# Patient Record
Sex: FEMALE | Race: WHITE | Employment: UNEMPLOYED | URBAN - NONMETROPOLITAN AREA
[De-identification: names, ages, dates, MRNs, and addresses within clinical notes are randomized per-mention and may not be internally consistent; named-entity substitution may affect disease eponyms.]

---

## 2020-02-24 ENCOUNTER — HOSPITAL ENCOUNTER (EMERGENCY)
Age: 30
Discharge: HOME | End: 2020-02-24
Payer: COMMERCIAL

## 2020-02-24 VITALS
RESPIRATION RATE: 16 BRPM | SYSTOLIC BLOOD PRESSURE: 120 MMHG | HEART RATE: 80 BPM | DIASTOLIC BLOOD PRESSURE: 71 MMHG | TEMPERATURE: 97.8 F | OXYGEN SATURATION: 98 %

## 2020-02-24 LAB
GROUP A STREP CULTURE, REFLEX: NEGATIVE
REFLEX THROAT C + S: NORMAL

## 2020-02-24 PROCEDURE — 99202 OFFICE O/P NEW SF 15 MIN: CPT | Performed by: NURSE PRACTITIONER

## 2020-02-24 PROCEDURE — 87070 CULTURE OTHR SPECIMN AEROBIC: CPT

## 2020-02-24 PROCEDURE — 87880 STREP A ASSAY W/OPTIC: CPT

## 2020-02-24 PROCEDURE — 99213 OFFICE O/P EST LOW 20 MIN: CPT

## 2020-02-24 RX ORDER — AMOXICILLIN 500 MG/1
500 CAPSULE ORAL 3 TIMES DAILY
Qty: 30 CAPSULE | Refills: 0 | Status: SHIPPED | OUTPATIENT
Start: 2020-02-24 | End: 2020-03-05

## 2020-02-24 ASSESSMENT — ENCOUNTER SYMPTOMS
DIARRHEA: 0
SHORTNESS OF BREATH: 0
COUGH: 1
RHINORRHEA: 1
VOMITING: 0
SORE THROAT: 1
SINUS PRESSURE: 0
NAUSEA: 0
BACK PAIN: 0
TROUBLE SWALLOWING: 0

## 2020-02-24 ASSESSMENT — PAIN DESCRIPTION - FREQUENCY: FREQUENCY: CONTINUOUS

## 2020-02-24 ASSESSMENT — PAIN SCALES - GENERAL: PAINLEVEL_OUTOF10: 7

## 2020-02-24 ASSESSMENT — PAIN DESCRIPTION - PAIN TYPE: TYPE: ACUTE PAIN

## 2020-02-24 ASSESSMENT — PAIN DESCRIPTION - LOCATION: LOCATION: THROAT

## 2020-02-24 ASSESSMENT — PAIN DESCRIPTION - DESCRIPTORS: DESCRIPTORS: SHARP

## 2020-02-25 NOTE — ED TRIAGE NOTES
Patient walked to room 2 for nasal congestion, sore throat onset a week ago and a cough that started today. No other needs.

## 2020-02-25 NOTE — ED PROVIDER NOTES
[facility] 36  Urgent Care Encounter       CHIEF COMPLAINT       Chief Complaint   Patient presents with    Pharyngitis     on and off for a week    Cough     onset today, yellow production     Nasal Congestion     runny nose        Nurses Notes reviewed and I agree except as noted in the HPI. HISTORY OF PRESENT ILLNESS   Torrie Berrios is a 26 y.o. female who presents to the urgent care center complaining of a sore throat that is been intermittently off for the past week. Patient also has had nasal congestion with postnasal drainage. Patient rates her throat pain 7 on a 10 scale. Patient states she has been drinking fluids. The patient stated today she started a cough with yellow mucus. The patient denies any fever or chills states that she has had some nausea. The patient stated that she is going to be flying out of the country in another week and had some concerns about getting treated prior to leaving. The patient at present time sitting on table skin is warm and dry does not appear to be in any acute distress. The history is provided by the patient. No  was used. Cough   Cough characteristics:  Productive  Sputum characteristics:  Yellow  Severity:  Moderate  Onset quality:  Sudden  Duration:  1 day  Timing:  Intermittent  Progression:  Waxing and waning  Chronicity:  New  Smoker: no    Associated symptoms: rhinorrhea and sore throat    Associated symptoms: no chest pain, no chills, no ear pain, no fever, no headaches, no rash and no shortness of breath    Rhinorrhea:     Quality:  Clear    Severity:  Mild    Duration:  1 week    Timing:  Intermittent    Progression:  Waxing and waning  Sore throat:     Severity:  Mild    Onset quality:  Gradual    Duration:  1 week    Timing:  Constant    Progression:  Unchanged      REVIEW OF SYSTEMS     Review of Systems   Constitutional: Negative for activity change, appetite change, chills, fatigue and fever. HENT: Positive for congestion, rhinorrhea and sore throat. Negative for ear pain, sinus pressure and trouble swallowing. Respiratory: Positive for cough. Negative for shortness of breath. Cardiovascular: Negative for chest pain. Gastrointestinal: Negative for diarrhea, nausea and vomiting. Genitourinary: Negative for decreased urine volume and difficulty urinating. Musculoskeletal: Negative for back pain and neck stiffness. Skin: Negative for rash. Allergic/Immunologic: Negative for environmental allergies. Neurological: Negative for dizziness, light-headedness and headaches. Hematological: Negative for adenopathy. PAST MEDICAL HISTORY         Diagnosis Date    Headache        SURGICALHISTORY     Patient  has a past surgical history that includes Cholecystectomy; Tonsillectomy and adenoidectomy; and Kegley tooth extraction. CURRENT MEDICATIONS       Discharge Medication List as of 2/24/2020  7:26 PM      CONTINUE these medications which have NOT CHANGED    Details   Pseudoephedrine-APAP-DM (DAYQUIL MULTI-SYMPTOM COLD/FLU PO) Take by mouthHistorical Med      ibuprofen (ADVIL;MOTRIN) 400 MG tablet Take 1 tablet by mouth every 8 hours as needed for Pain or Fever, Disp-120 tablet, R-3Print      Multiple Vitamins-Minerals (THERAPEUTIC MULTIVITAMIN-MINERALS) tablet Take 1 tablet by mouth dailyHistorical Med             ALLERGIES     Patient is is allergic to diflucan [fluconazole]. Patients   There is no immunization history on file for this patient. FAMILY HISTORY     Patient's family history is not on file. SOCIAL HISTORY     Patient  reports that she has never smoked. She has never used smokeless tobacco. She reports current alcohol use. She reports that she does not use drugs.     PHYSICAL EXAM     ED TRIAGE VITALS  BP: 120/71, Temp: 97.8 °F (36.6 °C), Pulse: 80, Resp: 16, SpO2: 98 %,Estimated body mass index is 24.55 kg/m² as calculated from the following:    Height as of 6/19/17: 5' 4\" (1.626 m). Weight as of 6/19/17: 143 lb (64.9 kg). ,Patient's last menstrual period was 01/28/2020. Physical Exam  Vitals signs and nursing note reviewed. Constitutional:       General: She is not in acute distress. Appearance: Normal appearance. She is well-developed and well-groomed. She is not ill-appearing, toxic-appearing or diaphoretic. HENT:      Head: Normocephalic. Right Ear: Hearing, tympanic membrane, ear canal and external ear normal. No drainage, swelling or tenderness. No mastoid tenderness. Tympanic membrane is not erythematous. Left Ear: Hearing, tympanic membrane, ear canal and external ear normal. No drainage, swelling or tenderness. No mastoid tenderness. Tympanic membrane is not erythematous. Nose: Congestion present. Right Sinus: No maxillary sinus tenderness or frontal sinus tenderness. Left Sinus: No maxillary sinus tenderness or frontal sinus tenderness. Mouth/Throat:      Lips: Pink. Mouth: Mucous membranes are moist.      Pharynx: Uvula midline. Posterior oropharyngeal erythema present. No uvula swelling. Tonsils: No tonsillar exudate or tonsillar abscesses. Eyes:      Conjunctiva/sclera: Conjunctivae normal.      Pupils: Pupils are equal, round, and reactive to light. Neck:      Musculoskeletal: Full passive range of motion without pain and normal range of motion. No neck rigidity. Cardiovascular:      Rate and Rhythm: Normal rate and regular rhythm. Heart sounds: Normal heart sounds. Pulmonary:      Effort: Pulmonary effort is normal. No accessory muscle usage. Breath sounds: Normal breath sounds. No decreased breath sounds, wheezing, rhonchi or rales. Abdominal:      General: Bowel sounds are normal.      Palpations: Abdomen is soft. Tenderness: There is no abdominal tenderness. There is no right CVA tenderness, left CVA tenderness or guarding. Negative signs include Mcclellan's sign.

## 2020-02-26 LAB — THROAT/NOSE CULTURE: NORMAL

## 2020-12-20 ENCOUNTER — NURSE TRIAGE (OUTPATIENT)
Dept: OTHER | Facility: CLINIC | Age: 30
End: 2020-12-20

## 2020-12-20 NOTE — TELEPHONE ENCOUNTER
Patient called pre-service center [facility]) mmo with red flag complaint. Brief description of triage: has to get covid test question     Triage indicates for patient to home care     Care advice provided, patient verbalizes understanding; denies any other questions or concerns; instructed to call back for any new or worsening symptoms. Attention Provider: Thank you for allowing me to participate in the care of your patient. The patient was connected to triage in response to information provided to the [facility]. Please do not respond through this encounter as the response is not directed to a shared pool. Reason for Disposition   Health Information question, no triage required and triager able to answer question    Answer Assessment - Initial Assessment Questions  1. REASON FOR CALL or QUESTION: \"What is your reason for calling today? \" or \"How can I best help you? \" or \"What question do you have that I can help answer? \"      Wanted to know if covid test was covered    Protocols used: INFORMATION ONLY CALL - NO TRIAGE[facility]

## 2022-03-10 PROBLEM — Z36.89 NST (NON-STRESS TEST) REACTIVE: Status: ACTIVE | Noted: 2022-03-10

## 2022-04-24 ENCOUNTER — ANESTHESIA EVENT (OUTPATIENT)
Dept: LABOR AND DELIVERY | Age: 32
End: 2022-04-24
Payer: COMMERCIAL

## 2022-04-24 ENCOUNTER — HOSPITAL ENCOUNTER (INPATIENT)
Age: 32
LOS: 2 days | Discharge: HOME | End: 2022-04-26
Attending: OBSTETRICS & GYNECOLOGY | Admitting: OBSTETRICS & GYNECOLOGY
Payer: COMMERCIAL

## 2022-04-24 ENCOUNTER — ANESTHESIA (OUTPATIENT)
Dept: LABOR AND DELIVERY | Age: 32
End: 2022-04-24
Payer: COMMERCIAL

## 2022-04-24 PROBLEM — O42.90 RUPTURE OF MEMBRANES WITH DELAY OF DELIVERY: Status: ACTIVE | Noted: 2022-04-24

## 2022-04-24 LAB
ABO: NORMAL
AMPHETAMINE+METHAMPHETAMINE URINE SCREEN: NEGATIVE
ANTIBODY SCREEN: NORMAL
BARBITURATE QUANTITATIVE URINE: NEGATIVE
BENZODIAZEPINE QUANTITATIVE URINE: NEGATIVE
CANNABINOID QUANTITATIVE URINE: NEGATIVE
COCAINE METABOLITE QUANTITATIVE URINE: NEGATIVE
ERYTHROCYTE [DISTWIDTH] IN BLOOD BY AUTOMATED COUNT: 13.1 % (ref 11.5–14.5)
ERYTHROCYTE [DISTWIDTH] IN BLOOD BY AUTOMATED COUNT: 43 FL (ref 35–45)
HCT VFR BLD CALC: 36.1 % (ref 37–47)
HEMOGLOBIN: 12.2 GM/DL (ref 12–16)
MCH RBC QN AUTO: 30.7 PG (ref 26–33)
MCHC RBC AUTO-ENTMCNC: 33.8 GM/DL (ref 32.2–35.5)
MCV RBC AUTO: 90.9 FL (ref 81–99)
OPIATES, URINE: NEGATIVE
OXYCODONE: NEGATIVE
PHENCYCLIDINE QUANTITATIVE URINE: NEGATIVE
PLATELET # BLD: 233 THOU/MM3 (ref 130–400)
PMV BLD AUTO: 10.4 FL (ref 9.4–12.4)
RBC # BLD: 3.97 MILL/MM3 (ref 4.2–5.4)
RH FACTOR: NORMAL
RPR: NONREACTIVE
WBC # BLD: 7.3 THOU/MM3 (ref 4.8–10.8)

## 2022-04-24 PROCEDURE — 6370000000 HC RX 637 (ALT 250 FOR IP): Performed by: OBSTETRICS & GYNECOLOGY

## 2022-04-24 PROCEDURE — 6360000002 HC RX W HCPCS: Performed by: OBSTETRICS & GYNECOLOGY

## 2022-04-24 PROCEDURE — 2500000003 HC RX 250 WO HCPCS

## 2022-04-24 PROCEDURE — 86592 SYPHILIS TEST NON-TREP QUAL: CPT

## 2022-04-24 PROCEDURE — 86900 BLOOD TYPING SEROLOGIC ABO: CPT

## 2022-04-24 PROCEDURE — 2500000003 HC RX 250 WO HCPCS: Performed by: ANESTHESIOLOGY

## 2022-04-24 PROCEDURE — 2580000003 HC RX 258: Performed by: OBSTETRICS & GYNECOLOGY

## 2022-04-24 PROCEDURE — 85027 COMPLETE CBC AUTOMATED: CPT

## 2022-04-24 PROCEDURE — 80307 DRUG TEST PRSMV CHEM ANLYZR: CPT

## 2022-04-24 PROCEDURE — 1200000000 HC SEMI PRIVATE

## 2022-04-24 PROCEDURE — 86850 RBC ANTIBODY SCREEN: CPT

## 2022-04-24 PROCEDURE — 6360000002 HC RX W HCPCS

## 2022-04-24 PROCEDURE — 0HQ9XZZ REPAIR PERINEUM SKIN, EXTERNAL APPROACH: ICD-10-PCS | Performed by: OBSTETRICS & GYNECOLOGY

## 2022-04-24 PROCEDURE — 86901 BLOOD TYPING SEROLOGIC RH(D): CPT

## 2022-04-24 PROCEDURE — 3700000025 EPIDURAL BLOCK: Performed by: ANESTHESIOLOGY

## 2022-04-24 PROCEDURE — 0UQMXZZ REPAIR VULVA, EXTERNAL APPROACH: ICD-10-PCS | Performed by: OBSTETRICS & GYNECOLOGY

## 2022-04-24 PROCEDURE — 7200000001 HC VAGINAL DELIVERY

## 2022-04-24 RX ORDER — SODIUM CHLORIDE 0.9 % (FLUSH) 0.9 %
5-40 SYRINGE (ML) INJECTION PRN
Status: DISCONTINUED | OUTPATIENT
Start: 2022-04-24 | End: 2022-04-26 | Stop reason: HOSPADM

## 2022-04-24 RX ORDER — IBUPROFEN 800 MG/1
800 TABLET ORAL EVERY 6 HOURS PRN
Status: DISCONTINUED | OUTPATIENT
Start: 2022-04-24 | End: 2022-04-26 | Stop reason: HOSPADM

## 2022-04-24 RX ORDER — MODIFIED LANOLIN
OINTMENT (GRAM) TOPICAL PRN
Status: DISCONTINUED | OUTPATIENT
Start: 2022-04-24 | End: 2022-04-26 | Stop reason: HOSPADM

## 2022-04-24 RX ORDER — ACETAMINOPHEN 500 MG
1000 TABLET ORAL EVERY 8 HOURS
Status: DISCONTINUED | OUTPATIENT
Start: 2022-04-24 | End: 2022-04-26 | Stop reason: HOSPADM

## 2022-04-24 RX ORDER — SODIUM CHLORIDE, SODIUM LACTATE, POTASSIUM CHLORIDE, CALCIUM CHLORIDE 600; 310; 30; 20 MG/100ML; MG/100ML; MG/100ML; MG/100ML
INJECTION, SOLUTION INTRAVENOUS CONTINUOUS
Status: DISCONTINUED | OUTPATIENT
Start: 2022-04-24 | End: 2022-04-24

## 2022-04-24 RX ORDER — ROPIVACAINE HYDROCHLORIDE 2 MG/ML
INJECTION, SOLUTION EPIDURAL; INFILTRATION; PERINEURAL PRN
Status: DISCONTINUED | OUTPATIENT
Start: 2022-04-24 | End: 2022-04-24 | Stop reason: SDUPTHER

## 2022-04-24 RX ORDER — BUTORPHANOL TARTRATE 1 MG/ML
1 INJECTION, SOLUTION INTRAMUSCULAR; INTRAVENOUS
Status: DISCONTINUED | OUTPATIENT
Start: 2022-04-24 | End: 2022-04-24 | Stop reason: HOSPADM

## 2022-04-24 RX ORDER — SODIUM CHLORIDE 9 MG/ML
INJECTION, SOLUTION INTRAVENOUS PRN
Status: DISCONTINUED | OUTPATIENT
Start: 2022-04-24 | End: 2022-04-26 | Stop reason: HOSPADM

## 2022-04-24 RX ORDER — ROPIVACAINE HYDROCHLORIDE 2 MG/ML
INJECTION, SOLUTION EPIDURAL; INFILTRATION; PERINEURAL
Status: COMPLETED
Start: 2022-04-24 | End: 2022-04-24

## 2022-04-24 RX ORDER — SODIUM CHLORIDE, SODIUM LACTATE, POTASSIUM CHLORIDE, AND CALCIUM CHLORIDE .6; .31; .03; .02 G/100ML; G/100ML; G/100ML; G/100ML
500 INJECTION, SOLUTION INTRAVENOUS PRN
Status: DISCONTINUED | OUTPATIENT
Start: 2022-04-24 | End: 2022-04-24 | Stop reason: HOSPADM

## 2022-04-24 RX ORDER — ASPIRIN 81 MG/1
81 TABLET ORAL DAILY
COMMUNITY

## 2022-04-24 RX ORDER — MISOPROSTOL 200 UG/1
800 TABLET ORAL PRN
Status: DISCONTINUED | OUTPATIENT
Start: 2022-04-24 | End: 2022-04-26 | Stop reason: HOSPADM

## 2022-04-24 RX ORDER — ONDANSETRON 2 MG/ML
4 INJECTION INTRAMUSCULAR; INTRAVENOUS EVERY 6 HOURS PRN
Status: DISCONTINUED | OUTPATIENT
Start: 2022-04-24 | End: 2022-04-24 | Stop reason: HOSPADM

## 2022-04-24 RX ORDER — MISOPROSTOL 200 UG/1
800 TABLET ORAL PRN
Status: DISCONTINUED | OUTPATIENT
Start: 2022-04-24 | End: 2022-04-24 | Stop reason: HOSPADM

## 2022-04-24 RX ORDER — TERBUTALINE SULFATE 1 MG/ML
0.25 INJECTION, SOLUTION SUBCUTANEOUS ONCE
Status: DISCONTINUED | OUTPATIENT
Start: 2022-04-24 | End: 2022-04-24 | Stop reason: HOSPADM

## 2022-04-24 RX ORDER — DOCUSATE SODIUM 100 MG/1
100 CAPSULE, LIQUID FILLED ORAL 2 TIMES DAILY
Status: DISCONTINUED | OUTPATIENT
Start: 2022-04-24 | End: 2022-04-24 | Stop reason: HOSPADM

## 2022-04-24 RX ORDER — FAMOTIDINE 10 MG/ML
20 INJECTION, SOLUTION INTRAVENOUS EVERY 6 HOURS PRN
Status: DISCONTINUED | OUTPATIENT
Start: 2022-04-24 | End: 2022-04-24

## 2022-04-24 RX ORDER — ONDANSETRON 4 MG/1
8 TABLET, ORALLY DISINTEGRATING ORAL EVERY 8 HOURS PRN
Status: DISCONTINUED | OUTPATIENT
Start: 2022-04-24 | End: 2022-04-26 | Stop reason: HOSPADM

## 2022-04-24 RX ORDER — SODIUM CHLORIDE 0.9 % (FLUSH) 0.9 %
5-40 SYRINGE (ML) INJECTION EVERY 12 HOURS SCHEDULED
Status: DISCONTINUED | OUTPATIENT
Start: 2022-04-24 | End: 2022-04-26 | Stop reason: HOSPADM

## 2022-04-24 RX ORDER — FAMOTIDINE 10 MG/ML
INJECTION, SOLUTION INTRAVENOUS
Status: COMPLETED
Start: 2022-04-24 | End: 2022-04-24

## 2022-04-24 RX ORDER — FAMOTIDINE 10 MG
10 TABLET ORAL ONCE
COMMUNITY

## 2022-04-24 RX ORDER — METHYLERGONOVINE MALEATE 0.2 MG/ML
200 INJECTION INTRAVENOUS PRN
Status: DISCONTINUED | OUTPATIENT
Start: 2022-04-24 | End: 2022-04-24 | Stop reason: HOSPADM

## 2022-04-24 RX ORDER — DOCUSATE SODIUM 100 MG/1
100 CAPSULE, LIQUID FILLED ORAL 2 TIMES DAILY
Status: DISCONTINUED | OUTPATIENT
Start: 2022-04-24 | End: 2022-04-26 | Stop reason: HOSPADM

## 2022-04-24 RX ORDER — SODIUM CHLORIDE, SODIUM LACTATE, POTASSIUM CHLORIDE, AND CALCIUM CHLORIDE .6; .31; .03; .02 G/100ML; G/100ML; G/100ML; G/100ML
1000 INJECTION, SOLUTION INTRAVENOUS PRN
Status: DISCONTINUED | OUTPATIENT
Start: 2022-04-24 | End: 2022-04-24 | Stop reason: HOSPADM

## 2022-04-24 RX ORDER — SODIUM CHLORIDE, SODIUM LACTATE, POTASSIUM CHLORIDE, CALCIUM CHLORIDE 600; 310; 30; 20 MG/100ML; MG/100ML; MG/100ML; MG/100ML
INJECTION, SOLUTION INTRAVENOUS CONTINUOUS
Status: DISCONTINUED | OUTPATIENT
Start: 2022-04-24 | End: 2022-04-26 | Stop reason: HOSPADM

## 2022-04-24 RX ORDER — METHYLERGONOVINE MALEATE 0.2 MG/ML
200 INJECTION INTRAVENOUS PRN
Status: DISCONTINUED | OUTPATIENT
Start: 2022-04-24 | End: 2022-04-26 | Stop reason: HOSPADM

## 2022-04-24 RX ORDER — ACETAMINOPHEN 325 MG/1
650 TABLET ORAL EVERY 4 HOURS PRN
Status: DISCONTINUED | OUTPATIENT
Start: 2022-04-24 | End: 2022-04-24 | Stop reason: HOSPADM

## 2022-04-24 RX ORDER — NALOXONE HYDROCHLORIDE 0.4 MG/ML
INJECTION, SOLUTION INTRAMUSCULAR; INTRAVENOUS; SUBCUTANEOUS PRN
Status: DISCONTINUED | OUTPATIENT
Start: 2022-04-24 | End: 2022-04-24 | Stop reason: HOSPADM

## 2022-04-24 RX ADMIN — BENZOCAINE AND LEVOMENTHOL: 200; 5 SPRAY TOPICAL at 18:38

## 2022-04-24 RX ADMIN — SODIUM CHLORIDE, POTASSIUM CHLORIDE, SODIUM LACTATE AND CALCIUM CHLORIDE: 600; 310; 30; 20 INJECTION, SOLUTION INTRAVENOUS at 10:44

## 2022-04-24 RX ADMIN — Medication 1 MILLI-UNITS/MIN: at 09:10

## 2022-04-24 RX ADMIN — SODIUM CHLORIDE, POTASSIUM CHLORIDE, SODIUM LACTATE AND CALCIUM CHLORIDE: 600; 310; 30; 20 INJECTION, SOLUTION INTRAVENOUS at 16:03

## 2022-04-24 RX ADMIN — FAMOTIDINE 20 MG: 10 INJECTION, SOLUTION INTRAVENOUS at 05:09

## 2022-04-24 RX ADMIN — ROPIVACAINE HYDROCHLORIDE 9 ML: 2 INJECTION, SOLUTION EPIDURAL; INFILTRATION at 12:52

## 2022-04-24 RX ADMIN — DOCUSATE SODIUM 100 MG: 100 CAPSULE, LIQUID FILLED ORAL at 21:31

## 2022-04-24 RX ADMIN — ONDANSETRON 4 MG: 2 INJECTION INTRAMUSCULAR; INTRAVENOUS at 14:48

## 2022-04-24 RX ADMIN — SODIUM CHLORIDE, POTASSIUM CHLORIDE, SODIUM LACTATE AND CALCIUM CHLORIDE: 600; 310; 30; 20 INJECTION, SOLUTION INTRAVENOUS at 05:10

## 2022-04-24 RX ADMIN — Medication 18 ML/HR: at 08:28

## 2022-04-24 RX ADMIN — Medication 166.7 ML: at 18:22

## 2022-04-24 RX ADMIN — SODIUM CHLORIDE, POTASSIUM CHLORIDE, SODIUM LACTATE AND CALCIUM CHLORIDE: 600; 310; 30; 20 INJECTION, SOLUTION INTRAVENOUS at 04:30

## 2022-04-24 RX ADMIN — IBUPROFEN 800 MG: 800 TABLET, FILM COATED ORAL at 21:31

## 2022-04-24 RX ADMIN — ONDANSETRON 4 MG: 2 INJECTION INTRAMUSCULAR; INTRAVENOUS at 05:28

## 2022-04-24 ASSESSMENT — PAIN DESCRIPTION - DESCRIPTORS
DESCRIPTORS: PRESSURE

## 2022-04-24 ASSESSMENT — PAIN SCALES - GENERAL
PAINLEVEL_OUTOF10: 4
PAINLEVEL_OUTOF10: 4

## 2022-04-24 NOTE — FLOWSHEET NOTE
Dr Prabhakar Stuart in room to place epidural. Plan of care discussed. Consents signed. Time out completed.

## 2022-04-24 NOTE — PLAN OF CARE
Problem: Vaginal Birth or  Section  Goal: Fetal and maternal status remain reassuring during the birth process  Description:  Birth OB-Pregnancy care plan goal which identifies if the fetal and maternal status remain reassuring during the birth process  2022 by Monet Delcid RN  Outcome: Progressing  Flowsheets (Taken 2022)  Fetal and Maternal Status Remain Reassuring During the Birth Process:   Monitor vital signs   Monitor fetal heart rate   Monitor uterine activity   Monitor labor progression (Vaginal delivery)  Note: Vital signs stable. Continuous fetal monitoring per external ultrasound. FHT's reassuring. Continuous monitoring of ctxs per [facility] monitor. Ctxs 2-6 min apart. Pitocin augmentation. Problem: Infection - Adult  Goal: Absence of infection during hospitalization  2022 by Monet Delcid RN  Outcome: Progressing  Flowsheets (Taken 2022)  Absence of infection during hospitalization:   Assess and monitor for signs and symptoms of infection   Monitor all insertion sites i.e., indwelling lines, tubes and drains   Instruct and encourage patient and family to use good hand hygiene technique  Note: Afebrile. Vital signs stable. -GBS     Problem: Safety - Adult  Goal: Free from fall injury  2022 by Monet Delcid RN  Outcome: Progressing  Flowsheets (Taken 2022)  Free From Fall Injury: Instruct family/caregiver on patient safety  Note: Bedrest with epidural.  in room. Call light with in reach. Side rails up x2.       Problem: Discharge Planning  Goal: Discharge to home or other facility with appropriate resources  2022 by Monet Delcid RN  Outcome: Progressing  Flowsheets (Taken 2022)  Discharge to home or other facility with appropriate resources:   Identify discharge learning needs (meds, wound care, etc)   Identify barriers to discharge with patient and caregiver   Arrange for needed discharge resources and transportation as appropriate  Note: Sun Abhijeet is aware she will remain on labor/delivery for 2 hrs post delivery bonding with baby then transfer to mom/baby unit for continued care til discharge. Problem: Pain  Goal: Verbalizes/displays adequate comfort level or baseline comfort level  4/24/2022 1009 by Oscar Medeiros, RN  Outcome: Progressing    Note: Pain goal 5/10. Comfortable with epidural.   Care plan reviewed with patient and . Patient and  verbalize understanding of the plan of care and contribute to goal setting.

## 2022-04-24 NOTE — ANESTHESIA PRE PROCEDURE
Department of Anesthesiology  Preprocedure Note       Name:  Kaylin Rahman   Age:  34 y.o.  :  1990                                          MRN:  [id number]         Date:  2022      Surgeon: * No surgeons listed *    Procedure: * No procedures listed *    Medications prior to admission:   Prior to Admission medications    Medication Sig Start Date End Date Taking?  Authorizing Provider   aspirin 81 MG EC tablet Take 81 mg by mouth daily   Yes Historical Provider, MD   famotidine (PEPCID) 10 MG tablet Take 10 mg by mouth once   Yes Historical Provider, MD   Probiotic Product (PROBIOTIC DAILY PO) Take by mouth daily   Yes Historical Provider, MD   Prenatal MV-Min-Fe Fum-FA-DHA (PRENATAL 1 PO) Take by mouth daily   Yes Historical Provider, MD       Current medications:    Current Facility-Administered Medications   Medication Dose Route Frequency Provider Last Rate Last Admin    oxytocin (PITOCIN) 30 units in 500 mL infusion  1 joy-units/min IntraVENous Continuous Luis Mello DO        terbutaline (BRETHINE) injection 0.25 mg  0.25 mg SubCUTAneous Once Luis Mello DO        lactated ringers infusion   IntraVENous Continuous Luis Mello  mL/hr at 22 0550 Rate Verify at 22 0550    lactated ringers bolus  500 mL IntraVENous PRN Luis Mello DO        Or    lactated ringers bolus  1,000 mL IntraVENous PRN Luis Mello DO        ondansetron [facility]) injection 4 mg  4 mg IntraVENous Q6H PRN Luis Mello, DO   4 mg at 22 0528    oxytocin (PITOCIN) 30 units in 500 mL infusion  87.3 joy-units/min IntraVENous Continuous PRN Luis Mello DO        And    oxytocin (PITOCIN) 10 unit bolus from the bag  10 Units IntraVENous PRN Luis Mello, DO        methylergonovine (METHERGINE) injection 200 mcg  200 mcg IntraMUSCular PRN Luis Mello DO        miSOPROStol (CYTOTEC) tablet 800 mcg  800 mcg Rectal PRN Luis Mello, DO  acetaminophen (TYLENOL) tablet 650 mg  650 mg Oral Q4H PRN Sendy Livers, DO        benzocaine-menthol (DERMOPLAST) 20-0.5 % spray   Topical PRN [place] Livers, DO        docusate sodium (COLACE) capsule 100 mg  100 mg Oral BID Sendy Livers, DO        butorphanol (STADOL) injection 1 mg  1 mg IntraVENous Q3H PRN [place] Livers, DO        famotidine (PEPCID) injection 20 mg  20 mg IntraVENous Q6H PRN [place] Livers, DO   20 mg at 04/24/22 0509    naloxone 0.4 mg in 10 mL sodium chloride syringe   IntraVENous PRN Sanju Castilloion, DO        [facility]dansetron [facility] injection 4 mg  4 mg IntraVENous Q6H PRN Sanju Castilloion, DO        fentaNYL 750 mcg, ropivacaine 0.1% in sodium chloride 0.9% 265mL (OB) epidural  18 mL/hr Epidural Continuous Brendan Goel, DO 18 mL/hr at 04/24/22 0828 18 mL/hr at 04/24/22 6300       Allergies:     Allergies   Allergen Reactions    Diflucan [Fluconazole] Rash       Problem List:    Patient Active Problem List   Diagnosis Code    Viral meningitis A87.9    NST (non-stress test) reactive Z36.89    Rupture of membranes with delay of delivery O42.90       Past Medical History:        Diagnosis Date    Headache     MTHFR gene mutation        Past Surgical History:        Procedure Laterality Date    CHOLECYSTECTOMY      WISDOM TOOTH EXTRACTION         Social History:    Social History     Tobacco Use    Smoking status: Never Smoker    Smokeless tobacco: Never Used   Substance Use Topics    Alcohol use: Not Currently     Comment: rarely                                Counseling given: Not Answered      Vital Signs (Current):   Vitals:    04/24/22 0700 04/24/22 0800 04/24/22 0810 04/24/22 0820   BP: (!) 110/51   (!) 124/58   Pulse: 62   81   Resp: 18   18   Temp:       TempSrc:       SpO2:  99% 97% 96%   Weight:       Height:                                                  BP Readings from Last 3 Encounters:   04/24/22 (!) 124/58   03/10/22 (!) 107/57   02/24/20 120/71       NPO Status:                                                                                 BMI:   Wt Readings from Last 3 Encounters:   04/24/22 180 lb (81.6 kg)   06/19/17 143 lb (64.9 kg)     Body mass index is 30.9 kg/m². CBC:   Lab Results   Component Value Date    WBC 7.3 04/24/2022    RBC 3.97 04/24/2022    HGB 12.2 04/24/2022    HCT 36.1 04/24/2022    MCV 90.9 04/24/2022    RDW 13.0 06/19/2017     04/24/2022       CMP:   Lab Results   Component Value Date     06/19/2017    K 3.7 06/19/2017    CL 99 06/19/2017    CO2 21 06/19/2017    BUN 9 06/19/2017    CREATININE 0.7 06/19/2017    LABGLOM >90 06/19/2017    GLUCOSE 121 06/19/2017    PROT 7.5 06/19/2017    CALCIUM 8.8 06/19/2017    BILITOT 1.7 06/19/2017    ALKPHOS 66 06/19/2017    AST 18 06/19/2017    ALT 13 06/19/2017       POC Tests: No results for input(s): POCGLU, POCNA, POCK, POCCL, POCBUN, POCHEMO, POCHCT in the last 72 hours. Coags: No results found for: PROTIME, INR, APTT    HCG (If Applicable):   Lab Results   Component Value Date    PREGTESTUR NEGATIVE 06/19/2017    PREGSERUM NEGATIVE 06/19/2017        ABGs: No results found for: PHART, PO2ART, UOS4AAW, UTE6BKT, BEART, C5NMWMBT     Type & Screen (If Applicable):  Lab Results   Component Value Date    LABRH POS 04/24/2022       Drug/Infectious Status (If Applicable):  No results found for: HIV, HEPCAB    COVID-19 Screening (If Applicable): No results found for: COVID19        Anesthesia Evaluation  Patient summary reviewed and Nursing notes reviewed  Airway: Mallampati: II        Dental:          Pulmonary: breath sounds clear to auscultation                             Cardiovascular:  Exercise tolerance: good (>4 METS),           Rhythm: regular  Rate: normal                    Neuro/Psych:               GI/Hepatic/Renal:             Endo/Other:                     Abdominal:       Abdomen: soft. Vascular:           Other Findings:             Anesthesia Plan      epidural     ASA 2             Anesthetic plan and risks discussed with patient and spouse.                       67 Duncan Barroso, DO   4/24/2022

## 2022-04-24 NOTE — FLOWSHEET NOTE
Dr Ashley Morin called unit and updated on current ve-complete, 0 station. Pt does have pressure with ctxs. Plan to start pushing. MD bansal call when ready.

## 2022-04-24 NOTE — ANESTHESIA PROCEDURE NOTES
Epidural Block    Patient location during procedure: OB  Reason for block: labor epidural  Staffing  Performed: anesthesiologist   Anesthesiologist: Janny De Luna, DO  Preanesthetic Checklist  Completed: patient identified, IV checked, site marked, risks and benefits discussed, surgical consent, monitors and equipment checked, pre-op evaluation, timeout performed, anesthesia consent given, oxygen available and patient being monitored  Epidural  Patient position: sitting  Prep: ChloraPrep  Location: L4-5  Injection technique: SHEILA saline  Provider prep: mask and sterile gloves  Needle  Needle type: Tuohy   Needle gauge: 18 G  Needle length: 3.5 in  Needle insertion depth: 8 cm  Catheter type: multi-orifice  Catheter size: 20 G  Catheter at skin depth: 12 cm  Test dose: negativeCatheter Secured: tegaderm and tape  Assessment  Hemodynamics: stable  Attempts: 2Outcomes: patient tolerated procedure well

## 2022-04-24 NOTE — FLOWSHEET NOTE
Spoke with Dr. Navjot Story. Updated her on pt arrival to unit with SROM 0230. Ctx 3-4 minutes, SVE 3/70/-2. FHT reactive. VSS. Plan for epidural. GBS negative. Pt requesting pepcid    Orders received.

## 2022-04-24 NOTE — PLAN OF CARE
Problem: Pain  Goal: Verbalizes/displays adequate comfort level or baseline comfort level  Outcome: Progressing  Flowsheets (Taken 2022)  Verbalizes/displays adequate comfort level or baseline comfort level:   Encourage patient to monitor pain and request assistance   Administer analgesics based on type and severity of pain and evaluate response   Assess pain using appropriate pain scale   Implement non-pharmacological measures as appropriate and evaluate response  Note: Pt plans for epidural when needed      Problem: Vaginal Birth or  Section  Goal: Fetal and maternal status remain reassuring during the birth process  Description:  Birth OB-Pregnancy care plan goal which identifies if the fetal and maternal status remain reassuring during the birth process  2022 by Jeny Sparrow RN  Flowsheets (Taken 2022)  Fetal and Maternal Status Remain Reassuring During the Birth Process:   Monitor vital signs   Monitor labor progression (Vaginal delivery)   Monitor fetal heart rate   Monitor uterine activity  Note: FHT reactive. Will continue to monitor FHT and ctx pattern   2022 by Jeny Sparrow RN  Outcome: Progressing     Problem: Infection - Adult  Goal: Absence of infection during hospitalization  2022 by Jeny Sparrow RN  Flowsheets (Taken 2022)  Absence of infection during hospitalization:   Assess and monitor for signs and symptoms of infection   Monitor lab/diagnostic results   Administer medications as ordered  Note: Pt afebrile. Will continue to monitor pt per policy   [id number] by Jeny Sparrow RN  Outcome: Progressing     Problem: Safety - Adult  Goal: Free from fall injury  2022 by Jeny Sparrow RN  Flowsheets (Taken 2022)  Free From Fall Injury: Instruct family/caregiver on patient safety  Note: Pt. Remains free from falls at this time. IV infusing per order. RN encouraged pt.  To call for assistance to BR. Side rails up X2. Call light within reach. S.O. At bedside. RN will continue to provide for a safe environment. 4/24/2022 0502 by Rudolph Agudelo RN  Outcome: Progressing     Problem: Discharge Planning  Goal: Discharge to home or other facility with appropriate resources  4/24/2022 0503 by Rudolph Agudelo RN  Flowsheets (Taken 4/24/2022 0503)  Discharge to home or other facility with appropriate resources:   Identify discharge learning needs (meds, wound care, etc)   Refer to discharge planning if patient needs post-hospital services based on physician order or complex needs related to functional status, cognitive ability or social support system   Arrange for needed discharge resources and transportation as appropriate  Note: Pt aware of 2hr recovery period in L&D and then transfer to mom/baby for the remainder of her stay. 4/24/2022 0502 by Rudolph Agudelo RN  Outcome: Progressing      Care plan reviewed with patient and Tricia Hensley. Patient and Tricia Hensley verbalize understanding of the plan of care and contribute to goal setting.

## 2022-04-24 NOTE — L&D DELIVERY NOTE
Department of Obstetrics and Gynecology  Spontaneous Vaginal Delivery Note      Pt Name: Matias Tinsley  MRN: [id number] Reji #: [de-identified]  YOB: 1990  Procedure Performed By: Kenyatta Lynn DO, D.O.        Pre-operative Diagnosis:  Term pregnancy, Spontaneous labor, Single fetus and Uncomplicated pregnancy    Post-operative Diagnosis: Same, delivered. Procedure:  Spontaneous vaginal delivery or Repair first degree and left labial spontaneous laceration    Surgeon:  Sandro Landry DO, D.O. Information for the patient's :  Skip Anjelica Girl Brice Bence [[id number]]          Anesthesia:  epidural anesthesia    Estimated blood loss:  400ml    Specimen:  Placenta not sent to pathology     Complications:  none    Condition:  infant stable to general nursery and mother stable    Details of Procedure: The patient is a 34 y.o. female at 44w2d   OB History        1    Para        Term                AB        Living           SAB        IAB        Ectopic        Molar        Multiple        Live Births                 who was admitted for active phase labor. She received the following interventions: IV Pitocin augmentation. The patient progressed well,did receive an epidural, became complete and started to push. Patient progressed well and the fetal head was delivered over an intact perineum, no nuchal cord was noted, and the rest of the infant delivered atraumatically. Infant was placed on mother abdomen. Nose and mouth suctioned with bulb suction. Cord was clamped and cut. The delivery of the placenta was spontaneous and noted intact. The perineum and vagina were explored and a first degree laceration was repaired in standard fashion with 3-0 vicryl. Left labial laceration was noted and repaired in a running fashion with 3-0 Vicryl. Sponge, instruments, and needle counts were correct. Needles were disposed of appropriately.       Delivery Summary:  Labor & Delivery Summary  Labor Onset Date: 22  Labor Onset Time: [address], D.O. Mother's Information    Labor Events     Labor?: No  Cervical Ripening:   Now         [place], Baby Girl Yumiko Garcia [[id number]]    Labor Events     Labor?: No   Steroids?: None  Cervical Ripening Date/Time:     Antibiotics Received during Labor?: No  Rupture Identifier: Sac 1   Rupture Date/Time: 22 02:30:00   Rupture Type: SROM  Fluid Color: Clear  Fluid Odor: None  Fluid Volume: Moderate  Induction: None  Augmentation: Oxytocin     Anesthesia    Method: Epidural, Local     Start Pushing    Labor onset date/time: 22 03:00:00 EDT Now   Dilation complete date/time: 22 14:55:00 Now   Start pushing date/time: 2022 16:14:00   Decision date/time (emergent ):       Delivery ([place])    Delivery Date/Time:  22 18:10:00   Delivery Type: Vaginal, Spontaneous    Details:         Presentation    Presentation: Vertex  _: Occiput  _: Anterior     Shoulder Dystocia    Shoulder Dystocia Present?: No  Add Second Maneuver  Add Third Maneuver  Add Fourth Maneuver  Add Fifth Maneuver  Add Sixth Maneuver  Add Seventh Maneuver  Add Eighth Maneuver  Add Ninth Maneuver     Assisted Delivery Details    Forceps Attempted?: No  Vacuum Extractor Attempted?: No     Document Additional Attempt       Document Additional Attempt             Cord    Vessels: 3 Vessels  Delayed Cord Clamping?: Yes  Cord Blood Disposition: Lab  Gases Sent?: No     Placenta       Lacerations    Episiotomy: None  Perineal Lacerations: 1st  Other Lacerations: labial laceration  Labial Laceration: left Repaired?: Yes      Vaginal Counts    Initial Count Personnel: CLARITA 10 SPONGES,1 NEEDLE,16 INSTRUMENTS,1 VACUUM    Sponges Needles Instruments   Initial Counts Correct Correct Correct   Final Counts      If the count is incorrect due to Intentionally Retained Foreign Object (IRFO) add the IRFO LDA in Lines/Drains.   Add LDA: Link to [facility]     Blood Loss  Mother: Ishmael Martínez #[id number]   Start of Mother's Information    Delivery Blood Loss  22 0300 - 22 1842    None           End of Mother's Information  Mother: Ishmael Martínez #[id number]          Delivery Providers    Delivering clinician: Michael Stallings DO     Provider Role    Oscar Medeiros, RN Primary Nurse    Nicolasa Martínez, RN Primary  Nurse    Lissett Rios DO Anesthesiologist    Elijah Coffman, RCP Respiratory Therapist (Night)          [facility] Assessment    Living Status: Living     Apgar Scoring Key:    0 1 2    Skin Color: Blue or pale Acrocyanotic Completely pink    Heart Rate: Absent <100 bpm >100 bpm    Reflex Irritability: No response Grimace Cry or active withdrawal    Muscle Tone: Limp Some flexion Active motion    Respiratory Effort: Absent Weak cry; hypoventilation Good, crying                  Skin Color:   Heart Rate:   Reflex Irritability:   Muscle Tone:   Respiratory Effort:    Total:            1 Minute:    0    2    2    2    2    8        Apgar 1 total from OB History    5 Minute:    1    2    2    2    2    9        Apgar 5 total from OB History    10 Minute:              15 Minute:              20 Minute:                      Apgars Assigned ByMariel Orellana RN          Resuscitation    Method: Stimulation            Measurements           Title    Skin to Skin Initiation Date/Time: 22 18:14:00 EDT   Skin to Skin With: Mother   Skin to Skin End Date/Time:

## 2022-04-24 NOTE — FLOWSHEET NOTE
Pt not pushing effectively ,support and encouragement given. Pt states she does not feel much pressure with ctxs. Will labor down for now.

## 2022-04-24 NOTE — FLOWSHEET NOTE
Dr Irene Nava updated ve 8cm/-1 station,pts feeling lots of pressure. Anesthesia to re dose epidural. MD states she is at home and call when ready.

## 2022-04-24 NOTE — FLOWSHEET NOTE
Dr Khadar Baldwin updated pt has epidural and is comfortable,ve 3cm/95%/-2station. Ctxs 2-6 min apart. Pitocin started. Continue current plan of care.

## 2022-04-24 NOTE — FLOWSHEET NOTE
Pt of Dr. Myles, , 39+2 weeks arrived on unit with complaints of SROM around 0230. Pt states that she has wet several pair of panties since then. Pt reports ctx about 10 minutes apart but became more intense around 0300. Reports positive fetal movement and denies vaginal bleeding. Pt oriented to room and instructed to change into hospital gown. Pt educated on urine drug screen policy. Pt verbally agrees and is instructed to collect urine specimen in case of admission for labor.

## 2022-04-24 NOTE — FLOWSHEET NOTE
Last ov 01/15/2018---ah Pt ambulate to BR. chux pad changed. Moderate amount of clear fluid noted on pad.

## 2022-04-24 NOTE — H&P
Obstetric Admission Note        CHIEF COMPLAINT: for delivery    HISTORY OF PRESENT ILLNESS:                     The patient is a 34 y.o.  female @ 39+2 weeks with significant past medical history of MTHFR mutation who presents with SROM @ 0230. Clear fluid. GoodFM. Uncomplicated pregnancy. Past Medical History:        Diagnosis Date    Headache     MTHFR gene mutation        Medications Prior to Admission:   Medications Prior to Admission: aspirin 81 MG EC tablet, Take 81 mg by mouth daily  famotidine (PEPCID) 10 MG tablet, Take 10 mg by mouth once  Probiotic Product (PROBIOTIC DAILY PO), Take by mouth daily  Prenatal MV-Min-Fe Fum-FA-DHA (PRENATAL 1 PO), Take by mouth daily  [DISCONTINUED] Pseudoephedrine-APAP-DM (DAYQUIL MULTI-SYMPTOM COLD/FLU PO), Take by mouth  [DISCONTINUED] ibuprofen (ADVIL;MOTRIN) 400 MG tablet, Take 1 tablet by mouth every 8 hours as needed for Pain or Fever  [DISCONTINUED] Multiple Vitamins-Minerals (THERAPEUTIC MULTIVITAMIN-MINERALS) tablet, Take 1 tablet by mouth daily    Allergies:  Diflucan [fluconazole]     DATA:      Cervical exam complete/+1  Membranes ruptured at 0230 clear fluid  FHT's cat 2  [place] q2-3min    ASSESSMENT AND PLAN:      Assessment problems  39+2 weeks  Active labor  MTHFR gene mutation    Plan:  - Admit to L&D  - Anticipate Vaginal Delivery  Oma TERRY

## 2022-04-24 NOTE — FLOWSHEET NOTE
Dr Josh Bowman paged for labor epidural. MD notified Giancarlo Hannah is not responding. MD states he will be up.

## 2022-04-24 NOTE — FLOWSHEET NOTE
Sterile prep and drape. Straight catheter placed per Dr Ghada Peace. Small amt urine returned. Pt continues tug a war with sheet with  while pushing.

## 2022-04-25 PROCEDURE — 1200000000 HC SEMI PRIVATE

## 2022-04-25 PROCEDURE — 6370000000 HC RX 637 (ALT 250 FOR IP): Performed by: OBSTETRICS & GYNECOLOGY

## 2022-04-25 RX ADMIN — IBUPROFEN 800 MG: 800 TABLET, FILM COATED ORAL at 05:43

## 2022-04-25 RX ADMIN — ACETAMINOPHEN 1000 MG: 500 TABLET ORAL at 12:24

## 2022-04-25 RX ADMIN — ACETAMINOPHEN 1000 MG: 500 TABLET ORAL at 20:46

## 2022-04-25 RX ADMIN — ACETAMINOPHEN 1000 MG: 500 TABLET ORAL at 04:15

## 2022-04-25 RX ADMIN — IBUPROFEN 800 MG: 800 TABLET, FILM COATED ORAL at 13:45

## 2022-04-25 RX ADMIN — DOCUSATE SODIUM 100 MG: 100 CAPSULE, LIQUID FILLED ORAL at 09:00

## 2022-04-25 RX ADMIN — IBUPROFEN 800 MG: 800 TABLET, FILM COATED ORAL at 21:55

## 2022-04-25 ASSESSMENT — PAIN SCALES - GENERAL
PAINLEVEL_OUTOF10: 6
PAINLEVEL_OUTOF10: 4
PAINLEVEL_OUTOF10: 3
PAINLEVEL_OUTOF10: 4
PAINLEVEL_OUTOF10: 5

## 2022-04-25 NOTE — ANESTHESIA POSTPROCEDURE EVALUATION
Department of Anesthesiology  Postprocedure Note    Patient: Manish Jiménez  MRN: [id number]  YOB: 1990  Date of evaluation: 4/25/2022  Time:  7:40 AM     Procedure Summary     Date: 04/24/22 Room / Location:     Anesthesia Start: 4588 Anesthesia Stop: 7563    Procedure: Labor Analgesia Diagnosis:     Scheduled Providers:  Responsible Provider: Chani Adair DO    Anesthesia Type: epidural ASA Status: 2          Anesthesia Type: epidural    Sydnie Phase I: Sydnie Score: 10    Sydnie Phase II: Sydnie Score: 10    Last vitals: Reviewed and per EMR flowsheets.        Anesthesia Post Evaluation    Patient location during evaluation: floor  Patient participation: complete - patient participated  Level of consciousness: awake  Airway patency: patent  Nausea & Vomiting: no vomiting and no nausea  Complications: no  Cardiovascular status: hemodynamically stable  Respiratory status: acceptable  Hydration status: stable

## 2022-04-25 NOTE — DISCHARGE SUMMARY
Obstetric Discharge Summary      Pt Name: Manuel Holm  MRN: [id number] Kimdarwin #: [de-identified]  YOB: 1990        Admitting Diagnosis  IUP  OB History        1    Para   1    Term   1            AB        Living   1       SAB        IAB        Ectopic        Molar        Multiple   0    Live Births   1                Reasons for Admission on 2022  4:02 AM  Rupture of membranes with delay of delivery [O42.90]  Spontaneous vaginal delivery [O80]    Postpartum/Operative Complications        Data  Information for the patient's :  Kristofer Jorgensen [[id number]]   female   Birth Weight: 7 lb 5.8 oz (3.34 kg)       Discharge Diagnosis  Postpartum, Vaginal Delivery    Discharge Information  Current Discharge Medication List      CONTINUE these medications which have NOT CHANGED    Details   aspirin 81 MG EC tablet Take 81 mg by mouth daily      famotidine (PEPCID) 10 MG tablet Take 10 mg by mouth once      Probiotic Product (PROBIOTIC DAILY PO) Take by mouth daily      Prenatal MV-Min-Fe Fum-FA-DHA (PRENATAL 1 PO) Take by mouth daily         STOP taking these medications       Pseudoephedrine-APAP-DM (DAYQUIL MULTI-SYMPTOM COLD/FLU PO) Comments:   Reason for Stopping:         ibuprofen (ADVIL;MOTRIN) 400 MG tablet Comments:   Reason for Stopping:         Multiple Vitamins-Minerals (THERAPEUTIC MULTIVITAMIN-MINERALS) tablet Comments:   Reason for Stopping:                   Vaginal Delivery  Diet regular    Condition: Stable  Discharge to:  home  Follow up in 4 weeks    Patient to be discharged on 22  Electronically signed by PHILIP Ceja CNP on 2022 at 10:30 AM

## 2022-04-25 NOTE — FLOWSHEET NOTE
Pt ambulated to BR to void. Cassie care explained and performed. Fresh pad, mesh undies, and gown applied. Fresh tucks pads applied to pad. Pt tolerated well. Due to void x1.

## 2022-04-25 NOTE — LACTATION NOTE
Pt states she will call out for latch assistance and home pump instruction when she is ready. Will follow up PRN.

## 2022-04-25 NOTE — PROGRESS NOTES
Department of Obstetrics and Gynecology  Labor and Delivery  Post Partum Day #1      SUBJECTIVE:  Patient feeling well with no complaints. Breastfeeding and pumping with some difficulty, lactation to consult. Mild lochia. Patient denies pain. Urination without difficulty. Positive flatus and no bowel movement. OBJECTIVE:BP (!) 112/58   Pulse 83   Temp 98.2 °F (36.8 °C) (Oral)   Resp 17   Ht 5' 4\" (1.626 m)   Wt 180 lb (81.6 kg)   SpO2 96%   Breastfeeding Unknown   BMI 30.90 kg/m²    ABDOMEN:  Soft, non-tender, fundus firm. GENITAL/URINARY:  Normal post partum findings. Extremities: warm and dry. No edema    DATA:    Lab Results   Component Value Date    HGB 12.2 04/24/2022    HCT 36.1 04/24/2022       ASSESSMENT & PLAN:    Normal post partum day 1 exam. Plan as per orders. Discharge home tomorrow.        Electronically signed by PHILIP Resendiz CNP on 4/25/2022 at 10:29 AM

## 2022-04-25 NOTE — LACTATION NOTE
Lactation  Consult  4/25/2022     On this visit with Terry Floyd, patient states she has been using the shield. Assisted Pt with shield use in football positions. Demonstrated ways to get a deeper latch. Pt states latch more comfortable in football position. Demonstrated latch without shield use as well. Discussed signs of a good feeds. Discussed ways to wake a sleepy infant, STS, and burping prior to feeds. Encouraged Pt to call out for assistance as needed. Will follow up PRN. At this visit we discussed handout, normal feeding patterns for first 24 hours and beyond, position and latch techniques, frequency and duration, skin to skin, pumping, breast milk storage, return to work, cues, burping, waking, hand expression, breast care, baby elimination patterns, community support, breast compression, establishing breast milk production/supply and shield use     Demo completed:hand expression, cues, waking & burping techniques, nipple shield application and Set up and instructed on proper use of breast pump    Additional items given: N/A    Encouraged skin to skin/kangaroo care. Offered verbal tips and assistance and encouraged to call and use support group prn.     Electronically signed by Stevenson Phalen, RN,IBCLC,RLC on 4/25/2022 at 12:18 PM

## 2022-04-25 NOTE — LACTATION NOTE
Pt states infant latched well with shield. Discussed breast milk storage guidelines. Nutrition handout provided per Pt request. Pt sates no other questions at this time. Will follow up PRN.

## 2022-04-25 NOTE — PLAN OF CARE
Problem: Postpartum  Goal: Experiences normal postpartum course  Description:  Postpartum OB-Pregnancy care plan goal which identifies if the mother is experiencing a normal postpartum course  Outcome: Progressing  Flowsheets (Taken 2022)  Experiences Normal Postpartum Course:   Monitor maternal vital signs   Assess uterine involution     Problem: Postpartum  Goal: Appropriate maternal -  bonding  Description:  Postpartum OB-Pregnancy care plan goal which identifies if the mother and  are bonding appropriately  Outcome: Progressing  Note: Bonding with baby, participating in infant care. Problem: Postpartum  Goal: Establishment of infant feeding pattern  Description:  Postpartum OB-Pregnancy care plan goal which identifies if the mother is establishing a feeding pattern with their   Outcome: Progressing  Flowsheets (Taken 2022)  Establishment of Infant Feeding Pattern: Refer to lactation as needed  Note: Working on breastfeeding       Problem: Postpartum  Goal: Incisions, wounds, or drain sites healing without S/S of infection  Outcome: Progressing     Problem: Pain  Goal: Verbalizes/displays adequate comfort level or baseline comfort level  Outcome: Progressing  Flowsheets (Taken 2022)  Verbalizes/displays adequate comfort level or baseline comfort level:   Assess pain using appropriate pain scale   Encourage patient to monitor pain and request assistance  Note: Pain controlled with po meds. Discussed ice for perineal pain and/or incisional pain or the use of warm blanket/heating pad for uterine cramps. Pt states her pain goal 4/10 has been met.       Problem: Infection - Adult  Goal: Absence of infection at discharge  Outcome: Progressing     Problem: Infection - Adult  Goal: Absence of infection during hospitalization  Outcome: Progressing     Problem: Safety - Adult  Goal: Free from fall injury  Outcome: Progressing     Problem: Discharge Planning  Goal: Discharge to home or other facility with appropriate resources  Outcome: Progressing     Care plan reviewed with patient and she contributes to goal setting and voices understanding of plan of care.

## 2022-04-25 NOTE — PROGRESS NOTES
Mom is in bed, breastfeeding baby and bonding well. Mom is calm and cooperative. Vital signs WDL and pain rated 5/10. RN notified and ice pack provided. Skin is pink, warm, and dry. Breasts are full and nipples are tender. Lanolin cream recommended. Fundus is firm and U/-1. Pt is voiding without difficulty and states that she is passing gas. Small amount of lochia rubra noted, pt denies any clots. Mild labial and perineal swelling noted, 1st degree laceration not visualized. Non-pitting edema noted on lower extremities and feet. Homans sign negative. All question and concerns answered.     Susana Heath

## 2022-04-25 NOTE — FLOWSHEET NOTE
Report given to oncoming nurse Arnold THOMAS. Pt transferred to 13 via wheelchair with infant daughter in arms, both in stable condition. Pt's  Samantha Anderson followed with all personal belongings in his possession.

## 2022-04-25 NOTE — PROGRESS NOTES
Assessment remains unchanged from morning. Vitals WDL and pain rated 5/10. All questions and concerns answered.

## 2022-04-25 NOTE — FLOWSHEET NOTE
Patient transferred to room 13 via wheelchair and L&D RN. Bedside report received. Patient oriented to room, call light, ducks in a row, hourly rounding, M in the Box, Menu and ordering, Admitly, Guide for [employer] given, VIS for Tdap, Welcome Letter, Medication info sheet. Patient verbalizes understanding. To call before getting up to BR.

## 2022-04-25 NOTE — FLOWSHEET NOTE
Patient up to bathroom for second time. Ambulated to bathroom without difficulty. Voided large amount. Patient educated on yvette care, use of yvette bottle, extra pads, emergency call light, and to call with any increased bleeding or clots. Patient states understanding. Ambulated back to bed without difficulty.  Fundal check post void at U/U.

## 2022-04-25 NOTE — PLAN OF CARE
Problem: Postpartum  Goal: Experiences normal postpartum course  Description:  Postpartum OB-Pregnancy care plan goal which identifies if the mother is experiencing a normal postpartum course  2022 by Shane aPcheco RN  Outcome: Progressing  Flowsheets (Taken 2022 by Campbell Angel RN)  Experiences Normal Postpartum Course:   Monitor maternal vital signs   Assess uterine involution  Note: Vital signs stable. Fundus firm midline even with umbilicus     Problem: Postpartum  Goal: Appropriate maternal -  bonding  Description:  Postpartum OB-Pregnancy care plan goal which identifies if the mother and  are bonding appropriately  2022 by Shane Pacheco RN  Outcome: Progressing  Note: Mother bonding well with infant     Problem: Postpartum  Goal: Establishment of infant feeding pattern  Description:  Postpartum OB-Pregnancy care plan goal which identifies if the mother is establishing a feeding pattern with their   2022 1111 by Shane Pacheco RN  Outcome: Progressing  Flowsheets (Taken 2022 by Campbell Angel RN)  Establishment of Infant Feeding Pattern: Refer to lactation as needed  Note: Lactation working with mother. Mother using nipple shield and pumping     Problem: Postpartum  Goal: Incisions, wounds, or drain sites healing without S/S of infection  2022 by Shane Pacheco RN  Outcome: Progressing  Flowsheets (Taken 2022)  Incisions, Wounds, or Drain Sites Healing Without Sign and Symptoms of Infection: Implement wound care per orders  Note: Pt using tucks, yvette bottle, ice packs and dermaplast to perineum. Instructed to wash daily with soap and water.      Problem: Pain  Goal: Verbalizes/displays adequate comfort level or baseline comfort level  2022 by Shane Pacheco RN  Outcome: Progressing  Flowsheets (Taken 2022 0840)  Verbalizes/displays adequate comfort level or baseline comfort level: Assess pain using appropriate pain scale  Note: Pt taking Motrin and tylenol for pain. Also using ice packs to perineum. Stated pain goal 5/10. Pt states pain being controlled. Problem: Infection - Adult  Goal: Absence of infection at discharge  4/25/2022 0929 by Elana Chong RN  Outcome: Progressing  Flowsheets (Taken 4/25/2022 0231)  Absence of infection at discharge: Assess and monitor for signs and symptoms of infection  Note: Vital signs stable. Pt having no foul vaginal discharge     Problem: Infection - Adult  Goal: Absence of infection during hospitalization  4/25/2022 0929 by Elana Chong RN  Outcome: Progressing  Flowsheets (Taken 4/25/2022 0840)  Absence of infection during hospitalization: Assess and monitor for signs and symptoms of infection  Note: Vital signs stable. Pt having no foul vaginal discharge     Problem: Safety - Adult  Goal: Free from fall injury  4/25/2022 0929 by Elana Chong RN  Outcome: Progressing  Flowsheets (Taken 4/25/2022 0840)  Free From Fall Injury: Instruct family/caregiver on patient safety  Note: Pt remains fall free     Problem: Discharge Planning  Goal: Discharge to home or other facility with appropriate resources  4/25/2022 0929 by Elana Chong RN  Outcome: Progressing  Flowsheets (Taken 4/25/2022 0840)  Discharge to home or other facility with appropriate resources: Identify barriers to discharge with patient and caregiver  Note: Working toward discharge     Care plan reviewed with patient and she contributes to goal setting and voices understanding of plan of care.

## 2022-04-26 VITALS
RESPIRATION RATE: 16 BRPM | SYSTOLIC BLOOD PRESSURE: 114 MMHG | HEART RATE: 83 BPM | OXYGEN SATURATION: 98 % | WEIGHT: 180 LBS | DIASTOLIC BLOOD PRESSURE: 65 MMHG | TEMPERATURE: 97.9 F | BODY MASS INDEX: 30.73 KG/M2 | HEIGHT: 64 IN

## 2022-04-26 PROCEDURE — 6370000000 HC RX 637 (ALT 250 FOR IP): Performed by: OBSTETRICS & GYNECOLOGY

## 2022-04-26 RX ADMIN — DOCUSATE SODIUM 100 MG: 100 CAPSULE, LIQUID FILLED ORAL at 09:07

## 2022-04-26 RX ADMIN — ACETAMINOPHEN 1000 MG: 500 TABLET ORAL at 05:26

## 2022-04-26 RX ADMIN — IBUPROFEN 800 MG: 800 TABLET, FILM COATED ORAL at 11:27

## 2022-04-26 RX ADMIN — IBUPROFEN 800 MG: 800 TABLET, FILM COATED ORAL at 05:26

## 2022-04-26 ASSESSMENT — PAIN DESCRIPTION - DESCRIPTORS: DESCRIPTORS: DISCOMFORT

## 2022-04-26 ASSESSMENT — PAIN SCALES - GENERAL: PAINLEVEL_OUTOF10: 4

## 2022-04-26 ASSESSMENT — PAIN DESCRIPTION - LOCATION: LOCATION: PERINEUM

## 2022-04-26 NOTE — PROGRESS NOTES
Discharge teaching and instructions for diagnosis/procedure of  completed with patient using teachback method. AVS reviewed. Patient voiced understanding regarding follow up appointments, and care of self at home.  Discharged in a wheelchair to  home with support per

## 2022-04-26 NOTE — FLOWSHEET NOTE
Patient vitals are 97.9 temperature, 114/65 blood pressure, 97% O2, 83 beats per minute, 16 respirations per minute. Patient is awake and oriented in semi-fowlers in bed eating breakfast. Appetite is good. Emotions appropriate and bonding well with . HEENT clear and symmetrical. Skin is warm and dry. Breasts feel firm with erect nipples. Breastfeeding successfully. Respirations unlabored and sounds clear in lungs throughout. Chest rise symmetrical. Heart sound normal and rate is regular and rhythmic. Capillary refill less than 3 seconds. Bowel sounds present x4. Uterus is firm and midline at umbilicus. Has voided but has not stooled yet though flatus is present. Lochia is small and rubra. Laceration is well approximated with no signs of infection. Trace edema present in lower extremities. Leatha's sign negative. Offered patient fresh water and bathroom.

## 2022-04-26 NOTE — PROGRESS NOTES
Post birth warning signs education paper given and reviewed, teaching complete. [place] postpartum depression screening discussed with patient, instructed to contact her healthcare provider if her score is > 10. Patient voiced understanding. Mother's blood type is O+.

## 2022-04-26 NOTE — PLAN OF CARE
Problem: Postpartum  Goal: Experiences normal postpartum course  Description:  Postpartum OB-Pregnancy care plan goal which identifies if the mother is experiencing a normal postpartum course  2022 by Alyson Mathias RN  Outcome: Progressing  Flowsheets (Taken 2022 by Nanci Lane RN)  Experiences Normal Postpartum Course:   Monitor maternal vital signs   Assess uterine involution     Problem: Postpartum  Goal: Appropriate maternal -  bonding  Description:  Postpartum OB-Pregnancy care plan goal which identifies if the mother and  are bonding appropriately  2022 by Alyson Mathias RN  Outcome: Progressing  Note: Pt appropriate with infant family and this RN     Problem: Postpartum  Goal: Establishment of infant feeding pattern  Description:  Postpartum OB-Pregnancy care plan goal which identifies if the mother is establishing a feeding pattern with their   2022 by Alyson Mathias RN  Outcome: Progressing  [address] (Taken 2022 by Nanci Lane RN)  Establishment of Infant Feeding Pattern: Refer to lactation as needed     Problem: Postpartum  Goal: Incisions, wounds, or drain sites healing without S/S of infection  2022 by Alyson Mathias RN  Outcome: Progressing  Flowsheets (Taken 2022 09 by Chung Hartmann RN)  Incisions, Wounds, or Drain Sites Healing Without Sign and Symptoms of Infection: Implement wound care per orders     Problem: Pain  Goal: Verbalizes/displays adequate comfort level or baseline comfort level  2022 by Alyson Mathias RN  Outcome: Progressing  Flowsheets (Taken 2022 0840 by Chung Hartmann RN)  Verbalizes/displays adequate comfort level or baseline comfort level: Assess pain using appropriate pain scale     Problem: Infection - Adult  Goal: Absence of infection at discharge  2022 by Alyson Mathias RN  Outcome: Progressing  Flowsheets (Taken 2022 0929 by Chung Hartmann RN)  Absence of infection at discharge: Assess and monitor for signs and symptoms of infection     Problem: Infection - Adult  Goal: Absence of infection during hospitalization  4/25/2022 2113 by Adal Santizo RN  Outcome: Progressing  Flowsheets (Taken 4/25/2022 0840 by Cris Arnold RN)  Absence of infection during hospitalization: Assess and monitor for signs and symptoms of infection     Problem: Safety - Adult  Goal: Free from fall injury  4/25/2022 2113 by Adal Santizo RN  Outcome: Bernabe Boone (Taken 4/25/2022 2046)  Free From Fall Injury: Instruct family/caregiver on patient safety     Problem: Discharge Planning  Goal: Discharge to home or other facility with appropriate resources  4/25/2022 2113 by Adal Santizo RN  Outcome: Progressing  Flowsheets (Taken 4/25/2022 0840 by Cris Arnold RN)  Discharge to home or other facility with appropriate resources: Identify barriers to discharge with patient and caregiver   Care plan reviewed with patient and she contributes to goal setting and voices understanding of plan of care.

## 2022-04-26 NOTE — PLAN OF CARE
Problem: Postpartum  Goal: Experiences normal postpartum course  Description:  Postpartum OB-Pregnancy care plan goal which identifies if the mother is experiencing a normal postpartum course  2022 by Tate Simpson RN  Outcome: Completed  Flowsheets (Taken 2022 by Nafisa Coffman RN)  Experiences Normal Postpartum Course:   Monitor maternal vital signs   Assess uterine involution  Note: Vital signs and assessments WNL, small rubra lochia, no clots  2022 by Tomasz Severino RN  Outcome: Progressing  Flowsheets (Taken 2022 by Nafisa Coffman RN)  Experiences Normal Postpartum Course:   Monitor maternal vital signs   Assess uterine involution  Goal: Appropriate maternal -  bonding  Description:  Postpartum OB-Pregnancy care plan goal which identifies if the mother and  are bonding appropriately  2022 by Tate Simpson RN  Outcome: Completed  Note: Bonding with baby, participating in infant care.     2022 by Tomasz Severino RN  Outcome: Progressing  Note: Pt appropriate with infant family and this RN  Goal: Establishment of infant feeding pattern  Description:  Postpartum OB-Pregnancy care plan goal which identifies if the mother is establishing a feeding pattern with their   2022 7350 by Tate Simpson RN  Outcome: Completed  Flowsheets (Taken 2022 by Nafisa Coffman RN)  Establishment of Infant Feeding Pattern: Refer to lactation as needed  Note: Infant exclusively breastfeeding this shift, every 2-3 hours, assistance and education provided to mother    2022 by Tomasz Severino RN  Outcome: Progressing  [address] (Taken 2022 by Nafisa Coffman RN)  Establishment of Infant Feeding Pattern: Refer to lactation as needed  Goal: Incisions, wounds, or drain sites healing without S/S of infection  2022 by Tate Simpson RN  Outcome: Completed  Flowsheets (Taken 2022)  Incisions, Wounds, or Drain Sites Healing Without Sign and Symptoms of Infection: TWICE DAILY: Assess and document skin integrity  Note: Left swollen labia, using ice  4/25/2022 2113 by Lexii Mclean RN  Outcome: Progressing  Flowsheets (Taken 4/25/2022 0929 by Karnie Gastelum RN)  Incisions, Wounds, or Drain Sites Healing Without Sign and Symptoms of Infection: Implement wound care per orders     Problem: Pain  Goal: Verbalizes/displays adequate comfort level or baseline comfort level  4/26/2022 0829 by Brad Montana RN  Outcome: Completed  Flowsheets (Taken 4/25/2022 0840 by Karine Gastelum RN)  Verbalizes/displays adequate comfort level or baseline comfort level: Assess pain using appropriate pain scale  Note: Pain controlled with po meds. Discussed ice for perineal pain or the use of warm blanket/heating pad for uterine cramps. Pt states her pain goal 5/10 has been met. 4/25/2022 2113 by Lexii Mclean RN  Outcome: Progressing  Flowsheets (Taken 4/25/2022 0840 by Karine Gastelum RN)  Verbalizes/displays adequate comfort level or baseline comfort level: Assess pain using appropriate pain scale     Problem: Infection - Adult  Goal: Absence of infection at discharge  4/26/2022 0829 by Brad Montana RN  Outcome: Completed  Flowsheets (Taken 4/25/2022 0929 by Karine Gastelum RN)  Absence of infection at discharge: Assess and monitor for signs and symptoms of infection  Note: Vital signs and assessments WNL. 4/25/2022 2113 by Lexii Mclean RN  Outcome: Progressing  Flowsheets (Taken 4/25/2022 7719 by Karine Gastelum RN)  Absence of infection at discharge: Assess and monitor for signs and symptoms of infection  Goal: Absence of infection during hospitalization  4/26/2022 0829 by Brad Montana RN  Outcome: Completed  Flowsheets (Taken 4/25/2022 0840 by Karine Gastelum RN)  Absence of infection during hospitalization: Assess and monitor for signs and symptoms of infection  Note: Vital signs and assessments WNL.     4/25/2022 2113 by Alyson Mathias RN  Outcome: Progressing  Flowsheets (Taken 4/25/2022 0840 by Chung Hartmann RN)  Absence of infection during hospitalization: Assess and monitor for signs and symptoms of infection     Problem: Safety - Adult  Goal: Free from fall injury  4/26/2022 0829 by Consuelo Carlson RN  Outcome: Completed  4/25/2022 2113 by Alyson Mathias RN  Outcome: Progressing  Flowsheets (Taken 4/25/2022 2046)  Free From Fall Injury: Instruct family/caregiver on patient safety     Problem: Discharge Planning  Goal: Discharge to home or other facility with appropriate resources  4/26/2022 0829 by Consuelo Carlson RN  Outcome: Completed  Flowsheets (Taken 4/25/2022 0840 by Chung Hartmann RN)  Discharge to home or other facility with appropriate resources: Identify barriers to discharge with patient and caregiver  Note: Plans to be discharged home with family when appropriate    4/25/2022 2113 by Alyson Mathias RN  Outcome: Progressing  Flowsheets (Taken 4/25/2022 0840 by Chung Hartmann RN)  Discharge to home or other facility with appropriate resources: Identify barriers to discharge with patient and caregiver   Care plan reviewed with patient and . Patient and  verbalize understanding of the plan of care and contribute to goal setting.